# Patient Record
Sex: FEMALE | URBAN - NONMETROPOLITAN AREA
[De-identification: names, ages, dates, MRNs, and addresses within clinical notes are randomized per-mention and may not be internally consistent; named-entity substitution may affect disease eponyms.]

---

## 2020-02-24 ENCOUNTER — APPOINTMENT (RX ONLY)
Dept: URBAN - NONMETROPOLITAN AREA CLINIC 31 | Facility: CLINIC | Age: 55
Setting detail: DERMATOLOGY
End: 2020-02-24

## 2020-02-24 VITALS — DIASTOLIC BLOOD PRESSURE: 70 MMHG | SYSTOLIC BLOOD PRESSURE: 110 MMHG

## 2020-02-24 DIAGNOSIS — L90.0 LICHEN SCLEROSUS ET ATROPHICUS: ICD-10-CM

## 2020-02-24 PROCEDURE — ? COUNSELING

## 2020-02-24 PROCEDURE — 99202 OFFICE O/P NEW SF 15 MIN: CPT

## 2020-02-24 PROCEDURE — ? PATIENT SPECIFIC COUNSELING

## 2020-02-24 PROCEDURE — ? PRESCRIPTION

## 2020-02-24 RX ORDER — CLOBETASOL PROPIONATE 0.5 MG/G
CREAM TOPICAL BID
Qty: 1 | Refills: 3 | Status: ERX | COMMUNITY
Start: 2020-02-24

## 2020-02-24 RX ADMIN — CLOBETASOL PROPIONATE: 0.5 CREAM TOPICAL at 00:00

## 2020-02-24 ASSESSMENT — LOCATION DETAILED DESCRIPTION DERM
LOCATION DETAILED: LEFT LABIA MINORA
LOCATION DETAILED: RIGHT LABIA MAJORA

## 2020-02-24 ASSESSMENT — LOCATION SIMPLE DESCRIPTION DERM: LOCATION SIMPLE: VULVA

## 2020-02-24 ASSESSMENT — LOCATION ZONE DERM: LOCATION ZONE: VULVA

## 2020-02-24 NOTE — PROCEDURE: MIPS QUALITY
Quality 226: Preventive Care And Screening: Tobacco Use: Screening And Cessation Intervention: Patient screened for tobacco use and is an ex/non-smoker
Quality 265: Biopsy Follow-Up: Biopsy results reviewed, communicated, tracked, and documented
Quality 431: Preventive Care And Screening: Unhealthy Alcohol Use - Screening: Patient screened for unhealthy alcohol use using a single question and scores less than 2 times per year
Detail Level: Generalized
Quality 110: Preventive Care And Screening: Influenza Immunization: Influenza immunization was not ordered or administered, reason not given
Quality 111:Pneumonia Vaccination Status For Older Adults: Pneumococcal Vaccination not Administered or Previously Received, Reason not Otherwise Specified
Quality 130: Documentation Of Current Medications In The Medical Record: Current Medications Documented

## 2020-02-24 NOTE — HPI: RASH (ECZEMA)
How Severe Is Your Eczema?: moderate
Is This A New Presentation, Or A Follow-Up?: Rash
Additional History: Patient had biopsy with Dr. Fisher and confirmed patient has eczema. Dr. Fisher recommended patient follow up with dermatologist. Patient had lichen simplex chronicis about 10 years ago and it resolved with treatment.

## 2020-02-24 NOTE — PROCEDURE: PATIENT SPECIFIC COUNSELING
Detail Level: Detailed
Mild, two small erosions on the posterior labia Majora. No evidence of scarring. \\n\\nHealthy female with acute onset itching and irritation on the external genitalia, without dysuria and dyspareunia. She reports an similar incident several years ago that resolved with a brief application of topical steroids. She initially treated with symptoms with a topical estrogen cream though this worsened itching. A biopsy was performed with OB physician that indicated a spongiotic dermatitis with resolving follicultis, If there was an irritant dermatitis secondary to estrogen cream, this may be what we are seeing on the biopsy. We will start with a brief treatment with class 1 topical steroids are reassess for improvement.

## 2020-09-15 ENCOUNTER — APPOINTMENT (RX ONLY)
Dept: URBAN - NONMETROPOLITAN AREA CLINIC 31 | Facility: CLINIC | Age: 55
Setting detail: DERMATOLOGY
End: 2020-09-15

## 2020-09-15 DIAGNOSIS — Z41.9 ENCOUNTER FOR PROCEDURE FOR PURPOSES OTHER THAN REMEDYING HEALTH STATE, UNSPECIFIED: ICD-10-CM

## 2020-09-15 PROCEDURE — ? COOLTONE

## 2020-09-15 ASSESSMENT — LOCATION DETAILED DESCRIPTION DERM: LOCATION DETAILED: PERIUMBILICAL SKIN

## 2020-09-15 ASSESSMENT — LOCATION SIMPLE DESCRIPTION DERM: LOCATION SIMPLE: ABDOMEN

## 2020-09-15 ASSESSMENT — LOCATION ZONE DERM: LOCATION ZONE: TRUNK

## 2020-09-15 NOTE — PROCEDURE: COOLTONE
Treatment Administered By (Optional): BP
Applicator Size: standard applicator
Location 1: abdomen
Time (Minutes - Will Only Render If Nonzero): 0
Device: CoolTone device
Consent: Verbal consent obtained, risks reviewed including, but not limited to, bruising, and/or need for multiple treatments.
Time (Minutes - Will Only Render If Nonzero): 30
Detail Level: Zone
Pre Treatment: Prior to treatment, the patient was asked to remove all metal. Cool tone paddle where placed on area treated and strapped on .
Post Treatment: After treatment, paddles were removed.

## 2020-12-08 ENCOUNTER — APPOINTMENT (RX ONLY)
Dept: URBAN - NONMETROPOLITAN AREA CLINIC 31 | Facility: CLINIC | Age: 55
Setting detail: DERMATOLOGY
End: 2020-12-08

## 2020-12-08 DIAGNOSIS — Z41.9 ENCOUNTER FOR PROCEDURE FOR PURPOSES OTHER THAN REMEDYING HEALTH STATE, UNSPECIFIED: ICD-10-CM

## 2020-12-08 PROCEDURE — ? COOLTONE

## 2020-12-08 ASSESSMENT — LOCATION DETAILED DESCRIPTION DERM: LOCATION DETAILED: PERIUMBILICAL SKIN

## 2020-12-08 ASSESSMENT — LOCATION ZONE DERM: LOCATION ZONE: TRUNK

## 2020-12-08 ASSESSMENT — LOCATION SIMPLE DESCRIPTION DERM: LOCATION SIMPLE: ABDOMEN

## 2020-12-08 NOTE — PROCEDURE: COOLTONE
Treatment Administered By (Optional): BP
Applicator Size: standard applicator
Time (Minutes - Will Only Render If Nonzero): 0
Time (Minutes - Will Only Render If Nonzero): 30
Pre Treatment: Prior to treatment, the patient was asked to remove all metal. Cool tone paddle where placed on area treated and strapped on .
Consent: Verbal consent obtained, risks reviewed including, but not limited to, bruising, and/or need for multiple treatments.
Device: CoolTone device
Detail Level: Zone
Location 1: abdomen
Post Treatment: After treatment, paddles were removed.

## 2022-05-06 ENCOUNTER — RX ONLY (OUTPATIENT)
Age: 57
Setting detail: RX ONLY
End: 2022-05-06

## 2022-05-06 RX ORDER — CLOBETASOL PROPIONATE 0.5 MG/G
CREAM TOPICAL
Qty: 30 | Refills: 0 | Status: ERX | COMMUNITY
Start: 2022-05-06